# Patient Record
Sex: FEMALE | Race: WHITE | Employment: UNEMPLOYED | URBAN - METROPOLITAN AREA
[De-identification: names, ages, dates, MRNs, and addresses within clinical notes are randomized per-mention and may not be internally consistent; named-entity substitution may affect disease eponyms.]

---

## 2018-04-24 ENCOUNTER — PAT TELEPHONE (OUTPATIENT)
Dept: PREADMISSION TESTING | Age: 78
End: 2018-04-24

## 2018-04-24 VITALS — WEIGHT: 138 LBS | HEIGHT: 64 IN | BODY MASS INDEX: 23.56 KG/M2

## 2018-04-24 RX ORDER — ATORVASTATIN CALCIUM 20 MG/1
20 TABLET, FILM COATED ORAL DAILY
COMMUNITY

## 2018-04-24 RX ORDER — ALENDRONATE SODIUM 70 MG/1
70 TABLET ORAL
COMMUNITY

## 2018-04-24 RX ORDER — LEVOTHYROXINE SODIUM 0.05 MG/1
50 TABLET ORAL DAILY
COMMUNITY

## 2018-04-24 RX ORDER — CYCLOBENZAPRINE HCL 10 MG
10 TABLET ORAL 3 TIMES DAILY PRN
COMMUNITY
End: 2019-05-02

## 2018-04-30 ENCOUNTER — HOSPITAL ENCOUNTER (OUTPATIENT)
Dept: ENDOSCOPY | Age: 78
Discharge: HOME | End: 2018-04-30
Attending: INTERNAL MEDICINE | Admitting: INTERNAL MEDICINE

## 2018-04-30 VITALS
TEMPERATURE: 97.6 F | BODY MASS INDEX: 23.73 KG/M2 | OXYGEN SATURATION: 99 % | SYSTOLIC BLOOD PRESSURE: 155 MMHG | DIASTOLIC BLOOD PRESSURE: 76 MMHG | RESPIRATION RATE: 16 BRPM | HEIGHT: 64 IN | WEIGHT: 139 LBS | HEART RATE: 72 BPM

## 2018-04-30 DIAGNOSIS — D50.9 IRON DEFICIENCY ANEMIA: ICD-10-CM

## 2018-04-30 RX ORDER — SODIUM CHLORIDE 9 MG/ML
INJECTION, SOLUTION INTRAVENOUS CONTINUOUS
Status: DISCONTINUED | OUTPATIENT
Start: 2018-04-30 | End: 2018-05-01 | Stop reason: HOSPADM

## 2018-04-30 RX ORDER — SODIUM CHLORIDE 0.9 % (FLUSH) 0.9 %
10 SYRINGE (ML) INJECTION EVERY 12 HOURS SCHEDULED
Status: DISCONTINUED | OUTPATIENT
Start: 2018-04-30 | End: 2018-05-01 | Stop reason: HOSPADM

## 2018-04-30 RX ORDER — ONDANSETRON 2 MG/ML
4 INJECTION INTRAMUSCULAR; INTRAVENOUS
Status: ACTIVE | OUTPATIENT
Start: 2018-04-30 | End: 2018-04-30

## 2018-04-30 RX ORDER — SODIUM CHLORIDE 0.9 % (FLUSH) 0.9 %
10 SYRINGE (ML) INJECTION PRN
Status: DISCONTINUED | OUTPATIENT
Start: 2018-04-30 | End: 2018-05-01 | Stop reason: HOSPADM

## 2018-04-30 RX ADMIN — SODIUM CHLORIDE: 9 INJECTION, SOLUTION INTRAVENOUS at 12:32

## 2018-04-30 ASSESSMENT — LIFESTYLE VARIABLES: SMOKING_STATUS: 0

## 2018-04-30 ASSESSMENT — PAIN - FUNCTIONAL ASSESSMENT: PAIN_FUNCTIONAL_ASSESSMENT: 0-10

## 2018-04-30 ASSESSMENT — PAIN SCALES - GENERAL
PAINLEVEL_OUTOF10: 0

## 2018-04-30 ASSESSMENT — ENCOUNTER SYMPTOMS: SHORTNESS OF BREATH: 0

## 2018-05-16 ENCOUNTER — PAT TELEPHONE (OUTPATIENT)
Dept: PREADMISSION TESTING | Age: 78
End: 2018-05-16

## 2018-05-16 VITALS — HEIGHT: 64 IN | BODY MASS INDEX: 23.73 KG/M2 | WEIGHT: 139 LBS

## 2018-05-18 RX ORDER — OXYCODONE HYDROCHLORIDE AND ACETAMINOPHEN 5; 325 MG/1; MG/1
2 TABLET ORAL PRN
Status: ACTIVE | OUTPATIENT
Start: 2018-05-18 | End: 2018-05-18

## 2018-05-18 RX ORDER — FENTANYL CITRATE 50 UG/ML
25 INJECTION, SOLUTION INTRAMUSCULAR; INTRAVENOUS EVERY 5 MIN PRN
Status: DISCONTINUED | OUTPATIENT
Start: 2018-05-18 | End: 2018-05-22 | Stop reason: HOSPADM

## 2018-05-18 RX ORDER — MORPHINE SULFATE 4 MG/ML
1 INJECTION, SOLUTION INTRAMUSCULAR; INTRAVENOUS EVERY 5 MIN PRN
Status: DISCONTINUED | OUTPATIENT
Start: 2018-05-18 | End: 2018-05-22 | Stop reason: HOSPADM

## 2018-05-18 RX ORDER — MEPERIDINE HYDROCHLORIDE 25 MG/ML
12.5 INJECTION INTRAMUSCULAR; INTRAVENOUS; SUBCUTANEOUS EVERY 5 MIN PRN
Status: DISCONTINUED | OUTPATIENT
Start: 2018-05-18 | End: 2018-05-22 | Stop reason: HOSPADM

## 2018-05-18 RX ORDER — OXYCODONE HYDROCHLORIDE AND ACETAMINOPHEN 5; 325 MG/1; MG/1
1 TABLET ORAL PRN
Status: ACTIVE | OUTPATIENT
Start: 2018-05-18 | End: 2018-05-18

## 2018-05-18 RX ORDER — FENTANYL CITRATE 50 UG/ML
50 INJECTION, SOLUTION INTRAMUSCULAR; INTRAVENOUS EVERY 5 MIN PRN
Status: DISCONTINUED | OUTPATIENT
Start: 2018-05-18 | End: 2018-05-22 | Stop reason: HOSPADM

## 2018-05-18 RX ORDER — ONDANSETRON 2 MG/ML
4 INJECTION INTRAMUSCULAR; INTRAVENOUS
Status: ACTIVE | OUTPATIENT
Start: 2018-05-18 | End: 2018-05-18

## 2018-05-18 RX ORDER — MORPHINE SULFATE 4 MG/ML
2 INJECTION, SOLUTION INTRAMUSCULAR; INTRAVENOUS EVERY 5 MIN PRN
Status: DISCONTINUED | OUTPATIENT
Start: 2018-05-18 | End: 2018-05-22 | Stop reason: HOSPADM

## 2018-05-18 ASSESSMENT — LIFESTYLE VARIABLES: SMOKING_STATUS: 0

## 2018-05-21 ENCOUNTER — HOSPITAL ENCOUNTER (OUTPATIENT)
Dept: ENDOSCOPY | Age: 78
Discharge: HOME | End: 2018-05-21

## 2018-05-21 VITALS
WEIGHT: 141 LBS | HEIGHT: 64 IN | RESPIRATION RATE: 18 BRPM | SYSTOLIC BLOOD PRESSURE: 134 MMHG | HEART RATE: 73 BPM | TEMPERATURE: 97.7 F | BODY MASS INDEX: 24.07 KG/M2 | DIASTOLIC BLOOD PRESSURE: 71 MMHG | OXYGEN SATURATION: 97 %

## 2018-05-21 DIAGNOSIS — D50.9 IRON DEFICIENCY ANEMIA: ICD-10-CM

## 2018-05-21 RX ORDER — LIDOCAINE HYDROCHLORIDE 10 MG/ML
1 INJECTION, SOLUTION EPIDURAL; INFILTRATION; INTRACAUDAL; PERINEURAL
Status: ACTIVE | OUTPATIENT
Start: 2018-05-21 | End: 2018-05-21

## 2018-05-21 RX ORDER — SODIUM CHLORIDE 9 MG/ML
INJECTION, SOLUTION INTRAVENOUS CONTINUOUS
Status: DISCONTINUED | OUTPATIENT
Start: 2018-05-21 | End: 2018-05-22 | Stop reason: HOSPADM

## 2018-05-21 RX ORDER — SODIUM CHLORIDE 0.9 % (FLUSH) 0.9 %
10 SYRINGE (ML) INJECTION PRN
Status: DISCONTINUED | OUTPATIENT
Start: 2018-05-21 | End: 2018-05-22 | Stop reason: HOSPADM

## 2018-05-21 RX ORDER — SODIUM CHLORIDE 0.9 % (FLUSH) 0.9 %
10 SYRINGE (ML) INJECTION EVERY 12 HOURS SCHEDULED
Status: DISCONTINUED | OUTPATIENT
Start: 2018-05-21 | End: 2018-05-22 | Stop reason: HOSPADM

## 2018-05-21 RX ADMIN — SODIUM CHLORIDE: 9 INJECTION, SOLUTION INTRAVENOUS at 12:46

## 2018-05-21 ASSESSMENT — PAIN - FUNCTIONAL ASSESSMENT: PAIN_FUNCTIONAL_ASSESSMENT: 0-10

## 2018-05-21 ASSESSMENT — PAIN SCALES - GENERAL
PAINLEVEL_OUTOF10: 0

## 2018-05-21 ASSESSMENT — PAIN DESCRIPTION - DESCRIPTORS: DESCRIPTORS: SHARP;RADIATING

## 2019-05-02 ENCOUNTER — APPOINTMENT (OUTPATIENT)
Dept: GENERAL RADIOLOGY | Age: 79
End: 2019-05-02
Payer: MEDICARE

## 2019-05-02 ENCOUNTER — HOSPITAL ENCOUNTER (OUTPATIENT)
Age: 79
Setting detail: OBSERVATION
Discharge: HOME | End: 2019-05-03
Attending: EMERGENCY MEDICINE | Admitting: INTERNAL MEDICINE
Payer: MEDICARE

## 2019-05-02 ENCOUNTER — APPOINTMENT (OUTPATIENT)
Dept: MRI IMAGING | Age: 79
End: 2019-05-02
Payer: MEDICARE

## 2019-05-02 ENCOUNTER — APPOINTMENT (OUTPATIENT)
Dept: CT IMAGING | Age: 79
End: 2019-05-02
Payer: MEDICARE

## 2019-05-02 DIAGNOSIS — R20.0 NUMBNESS: Primary | ICD-10-CM

## 2019-05-02 PROBLEM — G45.9 TIA (TRANSIENT ISCHEMIC ATTACK): Status: ACTIVE | Noted: 2019-05-02

## 2019-05-02 LAB
A/G RATIO: 1.3 (ref 1.1–2.2)
A/G RATIO: 1.4 (ref 1.1–2.2)
ALBUMIN SERPL-MCNC: 4.1 G/DL (ref 3.4–5)
ALBUMIN SERPL-MCNC: 4.1 G/DL (ref 3.4–5)
ALP BLD-CCNC: 65 U/L (ref 40–129)
ALP BLD-CCNC: 68 U/L (ref 40–129)
ALT SERPL-CCNC: 12 U/L (ref 10–40)
ALT SERPL-CCNC: 13 U/L (ref 10–40)
ANION GAP SERPL CALCULATED.3IONS-SCNC: 10 MMOL/L (ref 3–16)
ANION GAP SERPL CALCULATED.3IONS-SCNC: 11 MMOL/L (ref 3–16)
APTT: 30.4 SEC (ref 26–36)
AST SERPL-CCNC: 17 U/L (ref 15–37)
AST SERPL-CCNC: 18 U/L (ref 15–37)
BASOPHILS ABSOLUTE: 0 K/UL (ref 0–0.2)
BASOPHILS RELATIVE PERCENT: 0.7 %
BILIRUB SERPL-MCNC: 0.3 MG/DL (ref 0–1)
BILIRUB SERPL-MCNC: 0.3 MG/DL (ref 0–1)
BUN BLDV-MCNC: 11 MG/DL (ref 7–20)
BUN BLDV-MCNC: 13 MG/DL (ref 7–20)
CALCIUM SERPL-MCNC: 9.4 MG/DL (ref 8.3–10.6)
CALCIUM SERPL-MCNC: 9.5 MG/DL (ref 8.3–10.6)
CHLORIDE BLD-SCNC: 107 MMOL/L (ref 99–110)
CHLORIDE BLD-SCNC: 108 MMOL/L (ref 99–110)
CO2: 24 MMOL/L (ref 21–32)
CO2: 24 MMOL/L (ref 21–32)
CREAT SERPL-MCNC: 0.8 MG/DL (ref 0.6–1.2)
CREAT SERPL-MCNC: 0.9 MG/DL (ref 0.6–1.2)
EOSINOPHILS ABSOLUTE: 0.1 K/UL (ref 0–0.6)
EOSINOPHILS RELATIVE PERCENT: 2.1 %
GFR AFRICAN AMERICAN: >60
GFR AFRICAN AMERICAN: >60
GFR NON-AFRICAN AMERICAN: >60
GFR NON-AFRICAN AMERICAN: >60
GLOBULIN: 2.9 G/DL
GLOBULIN: 3.1 G/DL
GLUCOSE BLD-MCNC: 104 MG/DL (ref 70–99)
GLUCOSE BLD-MCNC: 89 MG/DL
GLUCOSE BLD-MCNC: 89 MG/DL (ref 70–99)
GLUCOSE BLD-MCNC: 97 MG/DL (ref 70–99)
HCT VFR BLD CALC: 39.6 % (ref 36–48)
HEMOGLOBIN: 13.3 G/DL (ref 12–16)
INR BLD: 0.96 (ref 0.86–1.14)
LYMPHOCYTES ABSOLUTE: 1.3 K/UL (ref 1–5.1)
LYMPHOCYTES RELATIVE PERCENT: 24.7 %
MCH RBC QN AUTO: 29.7 PG (ref 26–34)
MCHC RBC AUTO-ENTMCNC: 33.5 G/DL (ref 31–36)
MCV RBC AUTO: 88.6 FL (ref 80–100)
MONOCYTES ABSOLUTE: 0.4 K/UL (ref 0–1.3)
MONOCYTES RELATIVE PERCENT: 7 %
NEUTROPHILS ABSOLUTE: 3.6 K/UL (ref 1.7–7.7)
NEUTROPHILS RELATIVE PERCENT: 65.5 %
PDW BLD-RTO: 13.9 % (ref 12.4–15.4)
PERFORMED ON: NORMAL
PLATELET # BLD: 222 K/UL (ref 135–450)
PMV BLD AUTO: 8.1 FL (ref 5–10.5)
POTASSIUM REFLEX MAGNESIUM: 4.6 MMOL/L (ref 3.5–5.1)
POTASSIUM SERPL-SCNC: 4.5 MMOL/L (ref 3.5–5.1)
PROTHROMBIN TIME: 11 SEC (ref 9.8–13)
RBC # BLD: 4.47 M/UL (ref 4–5.2)
SODIUM BLD-SCNC: 142 MMOL/L (ref 136–145)
SODIUM BLD-SCNC: 142 MMOL/L (ref 136–145)
TOTAL PROTEIN: 7 G/DL (ref 6.4–8.2)
TOTAL PROTEIN: 7.2 G/DL (ref 6.4–8.2)
TROPONIN: <0.01 NG/ML
WBC # BLD: 5.4 K/UL (ref 4–11)

## 2019-05-02 PROCEDURE — 85730 THROMBOPLASTIN TIME PARTIAL: CPT

## 2019-05-02 PROCEDURE — 85025 COMPLETE CBC W/AUTO DIFF WBC: CPT

## 2019-05-02 PROCEDURE — 2580000003 HC RX 258: Performed by: INTERNAL MEDICINE

## 2019-05-02 PROCEDURE — 93010 ELECTROCARDIOGRAM REPORT: CPT | Performed by: INTERNAL MEDICINE

## 2019-05-02 PROCEDURE — 6360000004 HC RX CONTRAST MEDICATION: Performed by: EMERGENCY MEDICINE

## 2019-05-02 PROCEDURE — G0378 HOSPITAL OBSERVATION PER HR: HCPCS

## 2019-05-02 PROCEDURE — 93005 ELECTROCARDIOGRAM TRACING: CPT | Performed by: EMERGENCY MEDICINE

## 2019-05-02 PROCEDURE — 36415 COLL VENOUS BLD VENIPUNCTURE: CPT

## 2019-05-02 PROCEDURE — 70450 CT HEAD/BRAIN W/O DYE: CPT

## 2019-05-02 PROCEDURE — 85610 PROTHROMBIN TIME: CPT

## 2019-05-02 PROCEDURE — 80053 COMPREHEN METABOLIC PANEL: CPT

## 2019-05-02 PROCEDURE — 70551 MRI BRAIN STEM W/O DYE: CPT

## 2019-05-02 PROCEDURE — 6370000000 HC RX 637 (ALT 250 FOR IP): Performed by: INTERNAL MEDICINE

## 2019-05-02 PROCEDURE — 70496 CT ANGIOGRAPHY HEAD: CPT

## 2019-05-02 PROCEDURE — 84484 ASSAY OF TROPONIN QUANT: CPT

## 2019-05-02 PROCEDURE — 97161 PT EVAL LOW COMPLEX 20 MIN: CPT | Performed by: PHYSICAL THERAPIST

## 2019-05-02 PROCEDURE — 6360000002 HC RX W HCPCS: Performed by: INTERNAL MEDICINE

## 2019-05-02 PROCEDURE — 96372 THER/PROPH/DIAG INJ SC/IM: CPT

## 2019-05-02 PROCEDURE — 70498 CT ANGIOGRAPHY NECK: CPT

## 2019-05-02 PROCEDURE — 99285 EMERGENCY DEPT VISIT HI MDM: CPT

## 2019-05-02 PROCEDURE — 71045 X-RAY EXAM CHEST 1 VIEW: CPT

## 2019-05-02 RX ORDER — SODIUM CHLORIDE 0.9 % (FLUSH) 0.9 %
10 SYRINGE (ML) INJECTION EVERY 12 HOURS SCHEDULED
Status: DISCONTINUED | OUTPATIENT
Start: 2019-05-02 | End: 2019-05-03 | Stop reason: HOSPADM

## 2019-05-02 RX ORDER — TIZANIDINE 4 MG/1
4 TABLET ORAL NIGHTLY PRN
Refills: 3 | COMMUNITY
Start: 2019-03-05

## 2019-05-02 RX ORDER — CYCLOBENZAPRINE HCL 10 MG
10 TABLET ORAL 3 TIMES DAILY PRN
Status: DISCONTINUED | OUTPATIENT
Start: 2019-05-02 | End: 2019-05-03 | Stop reason: HOSPADM

## 2019-05-02 RX ORDER — SODIUM CHLORIDE 0.9 % (FLUSH) 0.9 %
10 SYRINGE (ML) INJECTION PRN
Status: DISCONTINUED | OUTPATIENT
Start: 2019-05-02 | End: 2019-05-03 | Stop reason: HOSPADM

## 2019-05-02 RX ORDER — LEVOTHYROXINE SODIUM 0.05 MG/1
50 TABLET ORAL DAILY
Status: DISCONTINUED | OUTPATIENT
Start: 2019-05-02 | End: 2019-05-03 | Stop reason: HOSPADM

## 2019-05-02 RX ORDER — ATORVASTATIN CALCIUM 40 MG/1
40 TABLET, FILM COATED ORAL DAILY
Status: DISCONTINUED | OUTPATIENT
Start: 2019-05-02 | End: 2019-05-03 | Stop reason: HOSPADM

## 2019-05-02 RX ORDER — ASPIRIN 81 MG/1
81 TABLET ORAL DAILY
Status: DISCONTINUED | OUTPATIENT
Start: 2019-05-02 | End: 2019-05-03 | Stop reason: HOSPADM

## 2019-05-02 RX ORDER — LABETALOL HYDROCHLORIDE 5 MG/ML
10 INJECTION, SOLUTION INTRAVENOUS EVERY 10 MIN PRN
Status: DISCONTINUED | OUTPATIENT
Start: 2019-05-02 | End: 2019-05-03 | Stop reason: HOSPADM

## 2019-05-02 RX ORDER — ONDANSETRON 2 MG/ML
4 INJECTION INTRAMUSCULAR; INTRAVENOUS EVERY 6 HOURS PRN
Status: DISCONTINUED | OUTPATIENT
Start: 2019-05-02 | End: 2019-05-03 | Stop reason: HOSPADM

## 2019-05-02 RX ADMIN — ASPIRIN 81 MG: 81 TABLET, COATED ORAL at 12:50

## 2019-05-02 RX ADMIN — ENOXAPARIN SODIUM 40 MG: 40 INJECTION SUBCUTANEOUS at 12:50

## 2019-05-02 RX ADMIN — CHOLECALCIFEROL CAP 125 MCG (5000 UNIT) 5000 UNITS: 125 CAP at 12:50

## 2019-05-02 RX ADMIN — CYCLOBENZAPRINE HYDROCHLORIDE 10 MG: 10 TABLET, FILM COATED ORAL at 12:52

## 2019-05-02 RX ADMIN — IOPAMIDOL 75 ML: 755 INJECTION, SOLUTION INTRAVENOUS at 07:51

## 2019-05-02 RX ADMIN — Medication 10 ML: at 21:19

## 2019-05-02 ASSESSMENT — PAIN DESCRIPTION - ONSET
ONSET: PROGRESSIVE
ONSET: AWAKENED FROM SLEEP

## 2019-05-02 ASSESSMENT — PAIN DESCRIPTION - PAIN TYPE: TYPE: ACUTE PAIN

## 2019-05-02 ASSESSMENT — PAIN DESCRIPTION - DESCRIPTORS
DESCRIPTORS: NUMBNESS
DESCRIPTORS: SHARP;BURNING

## 2019-05-02 ASSESSMENT — ENCOUNTER SYMPTOMS
EYE PAIN: 0
RHINORRHEA: 0
COUGH: 0
BACK PAIN: 1
SHORTNESS OF BREATH: 0
ABDOMINAL PAIN: 0
EYE DISCHARGE: 0
WHEEZING: 0
NAUSEA: 0
DIARRHEA: 0
VOMITING: 0
SORE THROAT: 0

## 2019-05-02 ASSESSMENT — PAIN DESCRIPTION - ORIENTATION
ORIENTATION: LEFT
ORIENTATION: RIGHT

## 2019-05-02 ASSESSMENT — PAIN DESCRIPTION - PROGRESSION
CLINICAL_PROGRESSION: NOT CHANGED
CLINICAL_PROGRESSION: GRADUALLY WORSENING

## 2019-05-02 ASSESSMENT — PAIN SCALES - GENERAL
PAINLEVEL_OUTOF10: 0
PAINLEVEL_OUTOF10: 4
PAINLEVEL_OUTOF10: 0
PAINLEVEL_OUTOF10: 0
PAINLEVEL_OUTOF10: 4

## 2019-05-02 ASSESSMENT — PAIN DESCRIPTION - FREQUENCY
FREQUENCY: CONTINUOUS
FREQUENCY: INTERMITTENT

## 2019-05-02 ASSESSMENT — PAIN - FUNCTIONAL ASSESSMENT: PAIN_FUNCTIONAL_ASSESSMENT: PREVENTS OR INTERFERES SOME ACTIVE ACTIVITIES AND ADLS

## 2019-05-02 ASSESSMENT — PAIN DESCRIPTION - LOCATION
LOCATION: HIP
LOCATION: FACE

## 2019-05-02 NOTE — ED NOTES
Pt ambulated to the bathroom independently, with steady gait.  No c/o pain or numbness     [facility]  05/02/19 [id number]

## 2019-05-02 NOTE — PROGRESS NOTES
Occupational Therapy    Discussed pt with PT and they report that pt is denying needs and has returned to baseline. Will defer eval at this time per pt wishes.     Gail Morataya OTR/L

## 2019-05-02 NOTE — CARE COORDINATION
INITIAL CASE MANAGEMENT ASSESSMENT    Reviewed chart, met with patient to assess possible discharge needs. Explained Case Management role/services. Living Situation:  Lives in own home with . Laundry is located in the basement rest of home is on one level. There is one step  to enter the home , no railing. Bathroom is a walk-in shower with built-in shower seat. ADLs:  Independent      DME:  Standard walker     PT/OT Recs:      Discharge Recommendations:  Home with assist PRN   PT Equipment Recommendations  Equipment Needed: No  Noemy Chester scored a 24/24 on the AM-PAC short mobility form. At this time, no further PT is recommended upon discharge. Recommend patient returns to prior setting with prior services. Active Services:   N/A      Transportation:  Per self or  by private car. Medications:  No issues getting , taking or affording medication. PCP:  Dr. Cuca Bhatt       HD/PD:   N/A     PLAN/COMMENTS:  Plans to return home with no anticipated needs.  is in good health and is able to assist if needed. SW/CM provided contact information for patient or family to call with any questions. SW/CM will follow and assist as needed.

## 2019-05-02 NOTE — H&P
Hospital Medicine History & Physical      PCP: Juan Dorsey    Date of Admission: 5/2/2019    Date of Service: Pt seen/examined on 5/2/2019 and Placed in Observation. Chief Complaint:  Left facial numbness      History Of Present Illness: The patient is a 70 y.o. female with hx HLD and hypothyroidism who presents to [facility] with left sided facial numbness. Pt states that she woke up this morning around 5AM and saw flashing red streaks of light of her left eye. She also stated that she was having left sided facial numbness along the left cheek, left lip, and left chin. Pt states that her blood pressure was also elevated to SBPs of 180s. She never has had high blood pressures before. She has never experienced symptoms like this before. Denies fever, chills, chest pain, shortness of breath, abdominal pain, nausea, vomiting, constipation, diarrhea, and dysuria. CT Head without contrast, CTA Head and Neck all negative for any acute abnormality. Past Medical History:        Diagnosis Date    Cancer [facility])     SKIN    Hyperlipidemia     Thyroid disease        Past Surgical History:        Procedure Laterality Date    COLONOSCOPY  04/30/2018    [facility], normal    JOINT REPLACEMENT      THR    JOINT REPLACEMENT      R hip    UPPER GASTROINTESTINAL ENDOSCOPY  05/21/2018    [facility]       Medications Prior to Admission:    Prior to Admission medications    Medication Sig Start Date End Date Taking?  Authorizing Provider   alendronate (FOSAMAX) 70 MG tablet Take 70 mg by mouth every 7 days    Historical Provider, MD   Cholecalciferol (VITAMIN D3) 5000 units TABS Take by mouth    Historical Provider, MD   atorvastatin (LIPITOR) 20 MG tablet Take 20 mg by mouth daily    Historical Provider, MD   levothyroxine (SYNTHROID) 50 MCG tablet Take 50 mcg by mouth Daily    Historical Provider, MD   cyclobenzaprine (FLEXERIL) 10 MG tablet Take 10 mg by mouth 3 times daily as needed for Muscle spasms    Historical Provider, MD       Allergies:  Patient has no known allergies. Social History:  The patient currently lives at home    TOBACCO:   reports that she has never smoked. She has never used smokeless tobacco.  ETOH:   reports that she does not drink alcohol. Family History:  Reviewed in detail and negative for DM, Early CAD, Cancer, CVA. Positive as follows:        Problem Relation Age of Onset    Heart Attack Mother     Cancer Father        REVIEW OF SYSTEMS:   Positive for and as noted in the HPI. All other systems reviewed and negative. PHYSICAL EXAM:    BP (!) 177/83   Pulse 77   Temp 97.5 °F (36.4 °C) (Oral)   Resp 17   Wt 136 lb 7.4 oz (61.9 kg)   SpO2 99%   BMI 23.42 kg/m²     General appearance: No apparent distress appears stated age and cooperative. HEENT Normal cephalic, atraumatic without obvious deformity. Pupils equal, round, and reactive to light. Extra ocular muscles intact. Conjunctivae/corneas clear. Neck: Supple, No jugular venous distention/bruits. Trachea midline without thyromegaly or adenopathy with full range of motion. Lungs: Clear to auscultation, bilaterally without Rales/Wheezes/Rhonchi with good respiratory effort. Heart: Regular rate and rhythm with Normal S1/S2 without murmurs, rubs or gallops, point of maximum impulse non-displaced  Abdomen: Soft, non-tender or non-distended without rigidity or guarding and positive bowel sounds all four quadrants. Extremities: No clubbing, cyanosis, or edema bilaterally. Full range of motion without deformity and normal gait intact. Skin: Skin color, texture, turgor normal.  No rashes or lesions. Neurologic: Alert and oriented X 3, neurovascularly intact with sensory/motor intact upper extremities/lower extremities, bilaterally. Decreased sensation over V2 and V3 on left side. Cranial nerves: II-XII intact, grossly non-focal.  Mental status: Alert, oriented, thought content appropriate. Capillary Refill: Acceptable  < 3 seconds  Peripheral Pulses: +3 Easily felt, not easily obliterated with pressure      CXR:  I have reviewed the CXR with the following interpretation: no acute cardiopulmonary process  EKG:  I have reviewed the EKG with the following interpretation: NSR    MRI brain without contrast   Final Result   1. No acute intracranial abnormality. No acute infarct. 2. Minimal global parenchymal volume loss with mild chronic microvascular   ischemic changes. XR CHEST PORTABLE   Final Result   No acute cardiopulmonary disease. CT Head WO Contrast   Final Result   No acute intracranial abnormality. Critical results were called by Dr. Manisha Villasenor MD to Rahul Hortencia   on 5/2/2019 at 08:03. CTA HEAD W CONTRAST   Final Result   1. Patient motion partially limits evaluation of the cervical carotid   arteries. 2. No convincing flow limiting stenosis of the cervical carotid/vertebral   arteries. 3. No focal stenosis of the [id number]-Hzmdug. CTA NECK W CONTRAST   Final Result   1. Patient motion partially limits evaluation of the cervical carotid   arteries. 2. No convincing flow limiting stenosis of the cervical carotid/vertebral   arteries. 3. No focal stenosis of the [id number]. CBC   Recent Labs     05/02/19  0748   WBC 5.4   HGB 13.3   HCT 39.6         RENAL  Recent Labs     05/02/19  0748      K 4.5      CO2 24   BUN 13   CREATININE 0.9     LFT'S  Recent Labs     05/02/19  0748   AST 18   ALT 13   BILITOT 0.3   ALKPHOS 68     COAG  No results for input(s): INR in the last 72 hours. CARDIAC ENZYMES  No results for input(s): CKTOTAL, CKMB, CKMBINDEX, TROPONINI in the last 72 hours.     U/A:  No results found for: NITRITE, COLORU, [address], RBCUA, MUCUS, BACTERIA, CLARITYU, SPECGRAV, LEUKOCYTESUR, BLOODU, GLUCOSEU, AMORPHOUS    ABG  No results found for: KGM2MXH, BEART, A4HFYGHK, PHART, THGBART, YVT6QRM, PO2ART, TRI0GQX        Active Hospital Problems    Diagnosis Date Noted    TIA (transient ischemic attack) [G45.9] 05/02/2019         PHYSICIANS CERTIFICATION:    I certify that Tamica Eng is expected to be hospitalized for less than 2 midnights based on the following assessment and plan:      ASSESSMENT/PLAN:      Left facial numbness - 2/2 TIA/CVA and/or complex migraine  -MRI Brain without contrast  -neurology consulted  -ASA and statin  -tele monitoring  -Labetalol PRN for SBP > 210  -permissive HTN for 24-48 hours  -PT/OT   -will re-evaluate eye symptoms; may have to refer to ophthalmology   -check lipid panel in AM    Hypothyroidism  -continue home meds      DVT Prophylaxis: Lovenox  Diet: DIET GENERAL;  Code Status: Full Code  PT/OT Eval Status: ordered    Dispo - admit PCU obs       Robert Mckenzie MD    Thank you Daryle Seat for the opportunity to be involved in this patient's care. If you have any questions or concerns please feel free to contact me at [phone].

## 2019-05-02 NOTE — ED PROVIDER NOTES
[facility]  eMERGENCY dEPARTMENT eNCOUnter        Pt Name: Amadou Wilder  MRN: [id number]  [place] 1940  Date of evaluation: 5/2/2019  Provider: Margaret Kowalski MD  PCP: Reece Montes De Oca       Chief Complaint   Patient presents with    Numbness     left side of face. woke up around 5am with numbness        HISTORY OFPRESENT ILLNESS   (Location/Symptom, Timing/Onset, Context/Setting, Quality, Duration, Modifying Factors,Severity)  Note limiting factors. Amadou Wilder is a 70 y.o. female       Location/Symptom: Left-sided facial numbness. Timing/Onset: Summer about an hour and a half ago. Context/Setting: She does not have a lot of chronic medical problems and she does not take any blood thinners. She does get numbness in her legs but it seems to be related to back and hip problems which is not acute. She woke up in usual state of health and then started having numbness along the left lower portion of her face. She is also describing flashing lights on the left side of her visual field that she states back like emergency lights flashing red. Does seem to be gone at the moment. Quality: Numbness seems to be isolated just to the left lower face  Duration: Approximately 2 hours  Modifying Factors: None  Severity: 0    Nursing Noteswere all reviewed and agreed with or any disagreements were addressed  in the HPI. REVIEW OF SYSTEMS    (2-9 systems for level 4, 10 or more for level 5)     Review of Systems   Constitutional: Negative for chills, fatigue and fever. HENT: Negative for ear pain, rhinorrhea and sore throat. Eyes: Positive for visual disturbance. Negative for pain and discharge. Respiratory: Negative for cough, shortness of breath and wheezing. Cardiovascular: Negative for chest pain, palpitations and leg swelling. Gastrointestinal: Negative for abdominal pain, diarrhea, nausea and vomiting.    Genitourinary: Negative for difficulty urinating, dysuria, pelvic pain and vaginal discharge. Musculoskeletal: Positive for back pain. Negative for arthralgias, joint swelling and neck pain. Skin: Negative for rash. Allergic/Immunologic: Negative for environmental allergies. Neurological: Positive for numbness. Negative for dizziness, seizures, syncope and headaches. Hematological: Negative for adenopathy. Psychiatric/Behavioral: Negative for dysphoric mood and suicidal ideas. The patient is not nervous/anxious. PAST MEDICAL HISTORY     Past Medical History:   Diagnosis Date    Cancer [facility])     SKIN    Hyperlipidemia     Thyroid disease          SURGICAL HISTORY     Past Surgical History:   Procedure Laterality Date    COLONOSCOPY  04/30/2018    [facility], normal    JOINT REPLACEMENT      THR    JOINT REPLACEMENT      R hip    UPPER GASTROINTESTINAL ENDOSCOPY  05/21/2018    [facility]         CURRENTMEDICATIONS       Previous Medications    ALENDRONATE (FOSAMAX) 70 MG TABLET    Take 70 mg by mouth every 7 days    ATORVASTATIN (LIPITOR) 20 MG TABLET    Take 20 mg by mouth daily    CHOLECALCIFEROL (VITAMIN D3) 5000 UNITS TABS    Take by mouth    CYCLOBENZAPRINE (FLEXERIL) 10 MG TABLET    Take 10 mg by mouth 3 times daily as needed for Muscle spasms    LEVOTHYROXINE (SYNTHROID) 50 MCG TABLET    Take 50 mcg by mouth Daily       ALLERGIES     Patient has no known allergies.     FAMILY HISTORY       Family History   Problem Relation Age of Onset    Heart Attack Mother     Cancer Father           SOCIAL HISTORY       Social History     Socioeconomic History    Marital status:      Spouse name: None    Number of children: None    Years of education: None    Highest education level: None   Occupational History    None   Social Needs    Financial resource strain: None    Food insecurity:     Worry: None     Inability: None    Transportation needs:     Medical: None     Non-medical: None   Tobacco Use  Smoking status: Never Smoker    Smokeless tobacco: Never Used   Substance and Sexual Activity    Alcohol use: No    Drug use: No    Sexual activity: None   Lifestyle    Physical activity:     Days per week: None     Minutes per session: None    Stress: None   Relationships    Social connections:     Talks on phone: None     Gets together: None     Attends Restoration service: None     Active member of club or organization: None     Attends meetings of clubs or organizations: None     Relationship status: None    Intimate partner violence:     Fear of current or ex partner: None     Emotionally abused: None     Physically abused: None     Forced sexual activity: None   Other Topics Concern    None   Social History Narrative    None       SCREENINGS   NIH Stroke Scale  NIH Stroke Scale Assessed: Yes  Interval: Reassessment  Level of Consciousness (1a. ): Alert  LOC Questions (1b. ): Answers both correctly  LOC Commands (1c. ): Obeys both correctly  Best Gaze (2. ): Normal  Visual (3. ): No visual loss  Facial Palsy (4. ): Normal  Motor Arm, Left (5a. ): No drift  Motor Arm, Right (5b. ): No drift  Motor Leg, Left (6a. ): No drift  Motor Leg, Right (6b. ): No drift  Limb Ataxia (7. ): Absent  Sensory (8. ): (!) Partial loss  Best Language (9. ): No aphasia  Dysarthria (10. ): Normal  Extinction and Inattention (11): No neglect  Total: 1         PHYSICAL EXAM    (up to 7 for level 4, 8 or more for level 5)     ED Triage Vitals [05/02/19 0741]   BP Temp Temp Source Pulse Resp SpO2 Height Weight   (!) 183/88 97.5 °F (36.4 °C) Oral 78 18 98 % -- 136 lb 7.4 oz (61.9 kg)      weight is 136 lb 7.4 oz (61.9 kg). Her oral temperature is 97.5 °F (36.4 °C). Her blood pressure is 177/83 (abnormal) and her pulse is 77. Her respiration is 17 and oxygen saturation is 99%. Physical Exam   Constitutional: She is oriented to person, place, and time. She appears well-developed and well-nourished.    HENT:   Head: Normocephalic and atraumatic. Right Ear: External ear normal.   Left Ear: External ear normal.   Eyes: Right eye exhibits no discharge. Left eye exhibits no discharge. No scleral icterus. Neck: Normal range of motion. No JVD present. No tracheal deviation present. No thyromegaly present. Cardiovascular: Normal rate and regular rhythm. Exam reveals no gallop and no friction rub. No murmur heard. Pulmonary/Chest: Effort normal and breath sounds normal. No stridor. No respiratory distress. She has no wheezes. She has no rales. Abdominal: Soft. She exhibits no distension. There is no tenderness. There is no rebound and no guarding. Musculoskeletal: She exhibits no edema or tenderness. Neurological: She is alert and oriented to person, place, and time. She has normal strength. A cranial nerve deficit and sensory deficit is present. Coordination normal.   Patient has numbness along the left cheek only. No other sensory deficits. No focal motor deficit. I gave her an NIH score of 2 because she told me it was April. Skin: Skin is warm and dry. No rash (On exposed body surfaces) noted. She is not diaphoretic. Psychiatric: She has a normal mood and affect.  Her behavior is normal. Thought content normal.       DIAGNOSTIC RESULTS   LABS:    Results for orders placed or performed during the hospital encounter of 05/02/19   CBC Auto Differential   Result Value Ref Range    WBC 5.4 4.0 - 11.0 K/uL    RBC 4.47 4.00 - 5.20 M/uL    Hemoglobin 13.3 12.0 - 16.0 g/dL    Hematocrit 39.6 36.0 - 48.0 %    MCV 88.6 80.0 - 100.0 fL    MCH 29.7 26.0 - 34.0 pg    MCHC 33.5 31.0 - 36.0 g/dL    RDW 13.9 12.4 - 15.4 %    Platelets 741 881 - 796 K/uL    MPV 8.1 5.0 - 10.5 fL    Neutrophils % 65.5 %    Lymphocytes % 24.7 %    Monocytes % 7.0 %    Eosinophils % 2.1 %    Basophils % 0.7 %    Neutrophils # 3.6 1.7 - 7.7 K/uL    Lymphocytes # 1.3 1.0 - 5.1 K/uL    Monocytes # 0.4 0.0 - 1.3 K/uL    Eosinophils # 0.1 0.0 - 0.6 K/uL Basophils # 0.0 0.0 - 0.2 K/uL   Comprehensive Metabolic Panel   Result Value Ref Range    Sodium 142 136 - 145 mmol/L    Potassium 4.5 3.5 - 5.1 mmol/L    Chloride 108 99 - 110 mmol/L    CO2 24 21 - 32 mmol/L    Anion Gap 10 3 - 16    Glucose 104 (H) 70 - 99 mg/dL    BUN 13 7 - 20 mg/dL    CREATININE 0.9 0.6 - 1.2 mg/dL    GFR Non-African American >60 >60    GFR African American >60 >60    Calcium 9.5 8.3 - 10.6 mg/dL    Total Protein 7.2 6.4 - 8.2 g/dL    Alb 4.1 3.4 - 5.0 g/dL    Albumin/Globulin Ratio 1.3 1.1 - 2.2    Total Bilirubin 0.3 0.0 - 1.0 mg/dL    Alkaline Phosphatase 68 40 - 129 U/L    ALT 13 10 - 40 U/L    AST 18 15 - 37 U/L    Globulin 3.1 g/dL   POCT Glucose   Result Value Ref Range    POC Glucose 89 70 - 99 mg/dl    Performed on ACCU-CHEK    POCT Glucose   Result Value Ref Range    Glucose 89 mg/dL       All other labs were within normal range or not returned as of this dictation. EKG: All EKG's are interpreted by the Emergency Department Physician who either signs orCo-signs this chart in the absence of a cardiologist.    EKG visualized preliminarily interpreted by myself shows sinus rhythm with rate is 62. The axis is normal at 3. Some criteria for left ventricular hypertrophy. Otherwise no acute injury or ischemic pattern. There are some nonspecific repolarization changes seen laterally. RADIOLOGY:   Non-plain film images such as CT, Ultrasound and MRI are read by the radiologist. Lucas Render radiographic images are visualized and preliminarily interpreted by the  EDProvider with the below findings:    Ct Head Wo Contrast    Result Date: 5/2/2019  EXAMINATION: CT OF THE HEAD WITHOUT CONTRAST  5/2/2019 7:50 am TECHNIQUE: CT of the head was performed without the administration of intravenous contrast. Dose modulation, iterative reconstruction, and/or weight based adjustment of the mA/kV was utilized to reduce the radiation dose to as low as reasonably achievable. COMPARISON: None.  HISTORY: ORDERING SYSTEM PROVIDED HISTORY: left sided facial numbness TECHNOLOGIST PROVIDED HISTORY: Has a \"code stroke\" or \"stroke alert\" been called? ->Yes Ordering Physician Provided Reason for Exam: Numbness (left side of face. woke up around 5am with numbness ) Acuity: Acute Type of Exam: Initial FINDINGS: BRAIN/VENTRICLES: There is no acute intracranial hemorrhage, mass effect or midline shift. No abnormal extra-axial fluid collection. The gray-white differentiation is maintained without evidence of an acute infarct. There is no evidence of hydrocephalus. ORBITS: The visualized portion of the orbits demonstrate no acute abnormality. SINUSES: The visualized paranasal sinuses and mastoid air cells demonstrate no acute abnormality. SOFT TISSUES/SKULL:  No acute abnormality of the visualized skull or soft tissues. No acute intracranial abnormality. Critical results were called by Dr. Akahs Orourke MD to Clemencia Oliveira on 5/2/2019 at 08:03. Xr Chest Portable    Result Date: 5/2/2019  EXAMINATION: SINGLE XRAY VIEW OF THE CHEST 5/2/2019 8:13 am COMPARISON: None. HISTORY: ORDERING SYSTEM PROVIDED HISTORY: left sided facial numbness TECHNOLOGIST PROVIDED HISTORY: Reason for exam:->left sided facial numbness Ordering Physician Provided Reason for Exam: poss stroke Acuity: Acute Type of Exam: Initial Relevant Medical/Surgical History: left side weakness FINDINGS: The cardiopericardial silhouette is within normal limits for AP technique. Atherosclerotic calcification of the thoracic aorta is noted. The visible lungs are without pneumothorax, pleural effusion or focal airspace opacity. No acute cardiopulmonary disease. Cta Neck W Contrast    Result Date: 5/2/2019  EXAMINATION: CTA OF THE HEAD WITH CONTRAST; CTA OF THE NECK 5/2/2019 7:50 am: TECHNIQUE: CTA of the head/brain was performed with the administration of intravenous contrast. Multiplanar reformatted images are provided for review.   MIP images are provided for review. Dose modulation, iterative reconstruction, and/or weight based adjustment of the mA/kV was utilized to reduce the radiation dose to as low as reasonably achievable.; CTA of the neck was performed with the administration of intravenous contrast. Multiplanar reformatted images are provided for review. MIP images are provided for review. Stenosis of the internal carotid arteries measured using NASCET criteria. Dose modulation, iterative reconstruction, and/or weight based adjustment of the mA/kV was utilized to reduce the radiation dose to as low as reasonably achievable. COMPARISON: None. HISTORY: ORDERING SYSTEM PROVIDED HISTORY: left sided facial numbness TECHNOLOGIST PROVIDED HISTORY: Has a \"code stroke\" or \"stroke alert\" been called? ->Yes Ordering Physician Provided Reason for Exam: Numbness (left side of face. woke up around 5am with numbness ) Acuity: Acute Type of Exam: Initial FINDINGS: CTA NECK: AORTIC ARCH/ARCH VESSELS: Streak artifact from in flowing contrast partially limits evaluation. There is a normal 3 vessel arch configuration. No convincing focal stenosis of the innominate or visualized subclavian arteries. CAROTID ARTERIES: No focal stenosis of the common carotid arteries. Atherosclerosis of the carotid bulbs and proximal internal carotid arteries. Evaluation of the internal carotid arteries is partially limited due to patient motion. No convincing flow limiting stenosis of the internal carotid arteries by NASCET criteria. VERTEBRAL ARTERIES: The vertebral arteries both arise from the subclavian arteries. No focal stenosis seen of the vertebral arteries. SOFT TISSUES: Minimal scarring in the lung apices bilaterally. No acute abnormality within the visualized superior mediastinum. BONES: No acute osseous abnormality seen. CTA HEAD: ANTERIOR CIRCULATION: Atherosclerosis of the internal carotid arteries bilaterally. No evidence of a flow limiting stenosis.   There is a hypoplastic right A1 segment, which is presumably degenerative in nature. No focal stenosis of the anterior cerebral or middle cerebral arteries. POSTERIOR CIRCULATION: The posterior cerebral arteries demonstrate no focal stenosis. The vertebral and basilar arteries appear unremarkable. BRAIN: No mass effect or midline shift. 1. Patient motion partially limits evaluation of the cervical carotid arteries. 2. No convincing flow limiting stenosis of the cervical carotid/vertebral arteries. 3. No focal stenosis of the [id number]. Cta Head W Contrast    Result Date: 5/2/2019  EXAMINATION: CTA OF THE HEAD WITH CONTRAST; CTA OF THE NECK 5/2/2019 7:50 am: TECHNIQUE: CTA of the head/brain was performed with the administration of intravenous contrast. Multiplanar reformatted images are provided for review. MIP images are provided for review. Dose modulation, iterative reconstruction, and/or weight based adjustment of the mA/kV was utilized to reduce the radiation dose to as low as reasonably achievable.; CTA of the neck was performed with the administration of intravenous contrast. Multiplanar reformatted images are provided for review. MIP images are provided for review. Stenosis of the internal carotid arteries measured using NASCET criteria. Dose modulation, iterative reconstruction, and/or weight based adjustment of the mA/kV was utilized to reduce the radiation dose to as low as reasonably achievable. COMPARISON: None. HISTORY: ORDERING SYSTEM PROVIDED HISTORY: left sided facial numbness TECHNOLOGIST PROVIDED HISTORY: Has a \"code stroke\" or \"stroke alert\" been called? ->Yes Ordering Physician Provided Reason for Exam: Numbness (left side of face. woke up around 5am with numbness ) Acuity: Acute Type of Exam: Initial FINDINGS: CTA NECK: AORTIC ARCH/ARCH VESSELS: Streak artifact from in flowing contrast partially limits evaluation. There is a normal 3 vessel arch configuration.   No convincing focal

## 2019-05-02 NOTE — PROGRESS NOTES
Physical Therapy    Facility/Department: [address] PROGRESSIVE CARE  Initial Assessment/Discharge Note    NAME: Jeanette Moncada  : 1940  MRN: [id number]    Date of Service: 2019    Discharge Recommendations:  Home with assist PRN   PT Equipment Recommendations  Equipment Needed: No  Jeanette Moncada scored a 24/24 on the AM-PAC short mobility form. At this time, no further PT is recommended upon discharge. Recommend patient returns to prior setting with prior services. Assessment   Assessment: pt appears to be at her baseline; no further therapy indicated  Prognosis: Good  Decision Making: Low Complexity  Patient Education: role and purpose of PT  Barriers to Learning: none  No Skilled PT: Safe to return home  REQUIRES PT FOLLOW UP: No  Activity Tolerance  Activity Tolerance: Patient Tolerated treatment well       Patient Diagnosis(es): The encounter diagnosis was Numbness. has a past medical history of Cancer ([facility] [address] Hyperlipidemia, and Thyroid disease. has a past surgical history that includes Colonoscopy (2018); joint replacement; joint replacement; and Upper gastrointestinal endoscopy (2018). Vision/Hearing  Vision: Within Functional Limits  Hearing: Within functional limits     Subjective  General  Chart Reviewed:  Yes  Additional Pertinent Hx: pt is a 71 yo female who was adm to \[facility]\"" with numbness in her L side of face  Response To Previous Treatment: Not applicable  Family / Caregiver Present: Yes( in room)  Follows Commands: Within Functional Limits  Subjective  Subjective: pt reports she has chronic pinched nerve in her R LE in which she has intermittent pain which she has had since HS  Pain Screening  Patient Currently in Pain: Denies          Orientation  Orientation  Overall Orientation Status: Within Functional Limits  Social/Functional History  Social/Functional History  Lives With: Spouse  Type of Home: House  Home Layout: Laundry in basement, One level  Home Access: Stairs to enter without rails  Entrance Stairs - Number of Steps: 1  Bathroom Shower/Tub: Walk-in shower  Bathroom Equipment: Built-in shower seat  Home Equipment: Standard walker  ADL Assistance: Independent  Homemaking Assistance: Independent  Ambulation Assistance: Independent  Transfer Assistance: Independent  Active : Yes  Mode of Transportation: Car  Cognition   Cognition  Overall Cognitive Status: WFL    Objective          AROM RLE (degrees)  RLE AROM: WFL  AROM LLE (degrees)  LLE AROM : WFL  Strength RLE  Comment: decreased hip flex due to pain  Strength LLE  Strength LLE: WFL     Sensation  Overall Sensation Status: WFL(reports numbness in face getting better)  Bed mobility  Supine to Sit: Independent  Transfers  Sit to Stand: Independent  Stand to sit:  Independent  Ambulation  Ambulation?: Yes  Ambulation 1  Surface: level tile  Device: No Device  Assistance: Independent  Quality of Gait: no LOB; slight limp due to chronic issues with her RLE  Stairs/Curb  Stairs?: No     Balance  Sitting - Static: Good  Sitting - Dynamic: Good  Standing - Static: Good  Standing - Dynamic: Good        Plan   Safety Devices  Type of devices: Call light within reach, Left in chair, Nurse notified             AM-PAC Score  AM-PAC Inpatient Mobility Raw Score : 24  AM-PAC Inpatient T-Scale Score : 61.14  Mobility Inpatient CMS 0-100% Score: 0  Mobility Inpatient CMS G-Code Modifier : [address]        Therapy Time   Individual Concurrent Group Co-treatment   Time In 1200         Time Out 1219         Minutes 19               Helen OWENS, PT, [id number]   HELEN GUPTA, PT

## 2019-05-02 NOTE — ED NOTES
Report called to  [facility]   On floor    [place] [address]   Cardiac monitor on  VSS, Afebrile, skin warm dry and intact, normal saline locked   Family updated on transfer            Cody Butler RN  05/02/19 1296

## 2019-05-02 NOTE — PROGRESS NOTES
4 Eyes Skin Assessment     The patient is being assess for  Admission    I agree that 2 RN's have performed a thorough Head to Toe Skin Assessment on the patient. ALL assessment sites listed below have been assessed. Areas assessed by both nurses: yes  [x]   Head, Face, and Ears   [x]   Shoulders, Back, and Chest  [x]   Arms, Elbows, and Hands   [x]   Coccyx, Sacrum, and IschIum  [x]   Legs, Feet, and Heels        Does the Patient have Skin Breakdown?   No         Jaiden Prevention initiated:  No   Wound Care Orders initiated:  No      [facility] nurse consulted for Pressure Injury (Stage 3,4, Unstageable, DTI, NWPT, and Complex wounds), New and Established Ostomies:  No      Nurse 1 eSignature: Electronically signed by Eric Brower RN on 5/2/19 at 6:19 PM    **SHARE this note so that the co-signing nurse is able to place an eSignature**    Nurse 2 eSignature: Electronically signed by Asuncion Bethea RN on 5/2/19 at 6:22 PM

## 2019-05-02 NOTE — CONSULTS
Neurology Consult Note  Reason for Consult: TIA/CVA versus complex migraine    Chief complaint: flashing lights, left facial numbness    Dr Yojana Melendez MD asked me to see Ashly Bustamante in consultation for evaluation of TIA/CVA versus complex migraine    History of Present Illness:  Ashly Bustamante is a 70 y.o. female who presents with vision changes and left facial numbness. I obtained my information via interview with the patient, supplemented by chart review. The patient says that she went to bed around 0030 this morning without any complaints. She says that she woke up from sleep around 0500, got onto the computer to play games, when she began experiencing flashing red streaks of light in the peripheral vision of only her left eye. These were constant. She says she feels like she was having some pain in her left eye, though not severe. She also noticed that the left side of her face was numb (below the eye to her chin and side of her mouth), which also was constant. She did take her BP at the time which was in the high 830K systolic. She denies any additional symptoms at the time. She did take an aspirin prior to arrival, concerned that she may be having a stroke. She says on the way to the ED, which was roughly ~ 2 hours after onset of her symptoms, the flashing red lights in her left eye subsided. BP here was noted to be 183/88. No fever. CT head was without any acute intracranial abnormality. CTA head/neck was without any evidence of a LVO, significant stenosis, or vascular malformation, within the limits of the exam.  She was not administered TPA. She says she continues to have left facial numbness and feels some pain in her left eye. The patient does have a history of migraines, though have been well controlled for several years. She says that she had been on maintenance therapy though cannot recall which medication.   In the past, she says she would experience a severe right sided headache, nausea, light sensitivity, and \"clouds\" in her right eye. She has significant chronic lumbar DDD w/ radiculopathy, primarily affecting her RLE. She says she just started taking a full dose aspirin on Sunday (had not been on prior). Medical History:  Past Medical History:   Diagnosis Date    Cancer [facility])     SKIN    Hyperlipidemia     Thyroid disease      Past Surgical History:   Procedure Laterality Date    COLONOSCOPY  04/30/2018    [place], normal    JOINT REPLACEMENT      THR    JOINT REPLACEMENT      R hip    UPPER GASTROINTESTINAL ENDOSCOPY  05/21/2018    [place]     Scheduled Meds:   atorvastatin  40 mg Oral Daily    vitamin D  5,000 Units Oral Daily    levothyroxine  50 mcg Oral Daily    sodium chloride flush  10 mL Intravenous 2 times per day    enoxaparin  40 mg Subcutaneous Daily    aspirin  81 mg Oral Daily     Medications Prior to Admission:   Diclofenac Sodium  MG TB24, Take 100 mg by mouth daily  tiZANidine (ZANAFLEX) 4 MG tablet, Take 4 mg by mouth nightly as needed for Muscle spasms  aspirin 325 MG EC tablet, Take 325 mg by mouth daily  alendronate (FOSAMAX) 70 MG tablet, Take 70 mg by mouth every 7 days Sunday  Cholecalciferol (VITAMIN D3) 5000 units TABS, Take 5,000 Units by mouth daily   atorvastatin (LIPITOR) 20 MG tablet, Take 20 mg by mouth daily  levothyroxine (SYNTHROID) 50 MCG tablet, Take 50 mcg by mouth Daily    No Known Allergies    Family History   Problem Relation Age of Onset    Heart Attack Mother     Cancer Father      Social History     Tobacco Use   Smoking Status Never Smoker   Smokeless Tobacco Never Used     Social History     Substance and Sexual Activity   Drug Use No     Social History     Substance and Sexual Activity   Alcohol Use No     ROS:  Constitutional- No weight loss or fevers  Eyes- No diplopia. No photophobia. Ears/nose/throat- No dysphagia. No Dysarthria  Cardiovascular- No palpitations.  No chest pain  Respiratory- No dyspnea. No Cough  Gastrointestinal- No Abdominal pain. No Vomiting. Genitourinary- No incontinence. No urinary retention  Musculoskeletal- No myalgia. No arthralgia  Skin- No rash. No easy bruising. Psychiatric- No depression. No anxiety  Endocrine- No diabetes. No thyroid issues. Hematologic- No bleeding difficulty. No fatigue  Neurologic- No weakness. No Headache. Exam:  Blood pressure (!) 167/88, pulse 71, temperature 97.9 °F (36.6 °C), temperature source Oral, resp. rate 20, weight 136 lb 7.4 oz (61.9 kg), SpO2 100 %. Constitutional    Vital signs: BP, HR, and RR reviewed   General alert, no distress, well-nourished  Eyes: unable to visualize the fundi  Cardiovascular: pulses symmetric in all 4 extremities. No peripheral edema. Psychiatric: cooperative with examination, no psychotic behavior noted. Neurologic  Mental status:   orientation to person, place, time, situation. General fund of knowledge grossly intact   Memory grossly intact   Attention intact as able to attend well to the exam     Language fluent in conversation. No aphasia. Comprehension intact; follows simple commands  Cranial nerves:   CN2: visual fields full w/o extinction on confrontational testing  CN 3,4,6: extraocular muscles intact. Pupils are equal, round, reactive bilaterally. CN5: diminished sensation on the left side of her face. CN7: face symmetric without dysarthria. CN8: she does wear hearing aids. CN9: palate elevated symmetrically  CN11: trap full strength on shoulder shrug  CN12: tongue midline with protrusion  Strength: No pronator drift. Good strength in BUE and LLE. ROM in her RLE is limited due to pain. Deep tendon reflexes: normal in all 4 extremities  Sensory: chronic sensory deficit in her RLE. Cerebellar/coordination: finger nose finger normal without ataxia  Tone: normal in all 4 extremities  Gait: deferred at this time.       Labs  Glucose 97  Na 142  K 4.6  BUN 11  Creatinine 0.8  LFTs normal    WBC 5.4K  Hg 13.3  Platelets 566  INR 8.77    Studies  CT head 5/2/19, independently reviewed  No acute intracranial abnormality. CTA head/neck 5/2/19, independently reviewed  No convincing flow limiting stenosis of the cervical carotid/vertebral arteries. No focal stenosis of the [place]. Partially limited evaluation of the cervical carotid arteries due to motion. EKG 5/2/19  Normal sinus rhythm. Impression  1. Persistent left facial numbness. She was also experiencing transient red flashing lights in the peripheral vision of her left eye, and does endorse a very mild pain of the left eye. There is concern for an acute stroke. Other etiologies could include a migraine variant (given her history) or other ophthalmalgic issue (pertaining to the left eye symptoms). 2.  Hyperlipidemia  3. Hx of migraines  4. Hx of lumbar back disease w/ radiculopathy. Recommendations  1. MRI brain w/o contrast.  Scheduled for this afternoon. 2.  Will need additional stroke workup if MRI is +. 3.  Permissive HTN for now. 4.  Continue antiplatelet. 5.  Continue statin. 6.  Telemetry to monitor for atrial fibrillation. 7.  May need an ophthalmology evaluation.       Casey Siddiqi NP  [address] [place] [address] [address] Neurology    A copy of this note was provided for Dr Robert Mckenzie MD

## 2019-05-03 VITALS
WEIGHT: 136.69 LBS | HEIGHT: 64 IN | BODY MASS INDEX: 23.34 KG/M2 | HEART RATE: 76 BPM | TEMPERATURE: 97.6 F | OXYGEN SATURATION: 96 % | SYSTOLIC BLOOD PRESSURE: 143 MMHG | RESPIRATION RATE: 16 BRPM | DIASTOLIC BLOOD PRESSURE: 82 MMHG

## 2019-05-03 PROBLEM — G45.9 TIA (TRANSIENT ISCHEMIC ATTACK): Status: RESOLVED | Noted: 2019-05-02 | Resolved: 2019-05-03

## 2019-05-03 LAB
CHOLESTEROL, TOTAL: 138 MG/DL (ref 0–199)
HDLC SERPL-MCNC: 50 MG/DL (ref 40–60)
LDL CHOLESTEROL CALCULATED: 67 MG/DL
TRIGL SERPL-MCNC: 105 MG/DL (ref 0–150)
VLDLC SERPL CALC-MCNC: 21 MG/DL

## 2019-05-03 PROCEDURE — 80061 LIPID PANEL: CPT

## 2019-05-03 PROCEDURE — 2580000003 HC RX 258: Performed by: INTERNAL MEDICINE

## 2019-05-03 PROCEDURE — 94760 N-INVAS EAR/PLS OXIMETRY 1: CPT

## 2019-05-03 PROCEDURE — 96372 THER/PROPH/DIAG INJ SC/IM: CPT

## 2019-05-03 PROCEDURE — 36415 COLL VENOUS BLD VENIPUNCTURE: CPT

## 2019-05-03 PROCEDURE — 6370000000 HC RX 637 (ALT 250 FOR IP)

## 2019-05-03 PROCEDURE — 6360000002 HC RX W HCPCS: Performed by: INTERNAL MEDICINE

## 2019-05-03 PROCEDURE — G0378 HOSPITAL OBSERVATION PER HR: HCPCS

## 2019-05-03 PROCEDURE — 6370000000 HC RX 637 (ALT 250 FOR IP): Performed by: INTERNAL MEDICINE

## 2019-05-03 RX ORDER — ACETAMINOPHEN 325 MG/1
650 TABLET ORAL EVERY 4 HOURS PRN
Status: DISCONTINUED | OUTPATIENT
Start: 2019-05-03 | End: 2019-05-03 | Stop reason: HOSPADM

## 2019-05-03 RX ORDER — ACETAMINOPHEN 325 MG/1
TABLET ORAL
Status: COMPLETED
Start: 2019-05-03 | End: 2019-05-03

## 2019-05-03 RX ADMIN — LEVOTHYROXINE SODIUM 50 MCG: 50 TABLET ORAL at 06:58

## 2019-05-03 RX ADMIN — ACETAMINOPHEN 650 MG: 325 TABLET ORAL at 05:20

## 2019-05-03 RX ADMIN — CHOLECALCIFEROL CAP 125 MCG (5000 UNIT) 5000 UNITS: 125 CAP at 09:06

## 2019-05-03 RX ADMIN — CYCLOBENZAPRINE HYDROCHLORIDE 10 MG: 10 TABLET, FILM COATED ORAL at 09:06

## 2019-05-03 RX ADMIN — ATORVASTATIN CALCIUM 40 MG: 40 TABLET, FILM COATED ORAL at 09:06

## 2019-05-03 RX ADMIN — Medication 10 ML: at 09:06

## 2019-05-03 RX ADMIN — ENOXAPARIN SODIUM 40 MG: 40 INJECTION SUBCUTANEOUS at 09:06

## 2019-05-03 RX ADMIN — ASPIRIN 81 MG: 81 TABLET, COATED ORAL at 09:06

## 2019-05-03 RX ADMIN — CYCLOBENZAPRINE HYDROCHLORIDE 10 MG: 10 TABLET, FILM COATED ORAL at 00:29

## 2019-05-03 ASSESSMENT — PAIN DESCRIPTION - DESCRIPTORS: DESCRIPTORS: BURNING

## 2019-05-03 ASSESSMENT — PAIN - FUNCTIONAL ASSESSMENT
PAIN_FUNCTIONAL_ASSESSMENT: PREVENTS OR INTERFERES SOME ACTIVE ACTIVITIES AND ADLS
PAIN_FUNCTIONAL_ASSESSMENT: PREVENTS OR INTERFERES SOME ACTIVE ACTIVITIES AND ADLS

## 2019-05-03 ASSESSMENT — PAIN DESCRIPTION - LOCATION
LOCATION: ABDOMEN
LOCATION: ABDOMEN

## 2019-05-03 ASSESSMENT — PAIN DESCRIPTION - PAIN TYPE: TYPE: CHRONIC PAIN

## 2019-05-03 ASSESSMENT — PAIN DESCRIPTION - PROGRESSION
CLINICAL_PROGRESSION: GRADUALLY WORSENING
CLINICAL_PROGRESSION: GRADUALLY IMPROVING

## 2019-05-03 ASSESSMENT — PAIN SCALES - GENERAL
PAINLEVEL_OUTOF10: 6
PAINLEVEL_OUTOF10: 5
PAINLEVEL_OUTOF10: 5
PAINLEVEL_OUTOF10: 3
PAINLEVEL_OUTOF10: 2

## 2019-05-03 ASSESSMENT — PAIN DESCRIPTION - ONSET
ONSET: ON-GOING
ONSET: ON-GOING

## 2019-05-03 ASSESSMENT — PAIN DESCRIPTION - FREQUENCY: FREQUENCY: INTERMITTENT

## 2019-05-03 NOTE — PLAN OF CARE
Continued monitoring of pt condition throughout hospitalization for improvement/worsening of condition

## 2019-05-03 NOTE — DISCHARGE SUMMARY
Hospital Medicine Discharge Summary    Patient ID: Jody Covarrubias      Patient's PCP: Michael Crowley Date: 5/2/2019     Discharge Date:   5/3/2019    Admitting Physician: Judie Linares MD     Discharge Physician: Judie Linares MD     Discharge Diagnoses: There are no active hospital problems to display for this patient. The patient was seen and examined on day of discharge and this discharge summary is in conjunction with any daily progress note from day of discharge. Hospital Course: The patient is a 70 y.o. female with hx HLD and hypothyroidism who presents to [facility] with left sided facial numbness. Pt states that she woke up this morning around 5AM and saw flashing red streaks of light of her left eye. She also stated that she was having left sided facial numbness along the left cheek, left lip, and left chin. Pt states that her blood pressure was also elevated to SBPs of 180s. She never has had high blood pressures before. She has never experienced symptoms like this before. Denies fever, chills, chest pain, shortness of breath, abdominal pain, nausea, vomiting, constipation, diarrhea, and dysuria.     CT Head without contrast, CTA Head and Neck all negative for any acute abnormality. Left facial numbness - resolved - 2/2 TIA and/or migraine with aura  -MRI Brain without contrast without acute abnormality  -neurology consulted, recs appreciated  -cont ASA and statin  -tele monitoring  -Labetalol PRN for SBP > 210  -permissive HTN for 24-48 hours, now WNL without intervention  -PT/OT   -eye symptoms resolved prior to discharge  -checked lipid panel in AM     Hypothyroidism  -continue home meds        Exam:     BP (!) 143/82   Pulse 76   Temp 97.6 °F (36.4 °C) (Oral)   Resp 16   Ht 5' 4\" (1.626 m)   Wt 136 lb 11 oz (62 kg)   SpO2 96%   BMI 23.46 kg/m²       General appearance:  No apparent distress, appears stated age and cooperative.   HEENT:  Normal 5/2/2019 at 08:03. CTA HEAD W CONTRAST   Final Result   1. Patient motion partially limits evaluation of the cervical carotid   arteries. 2. No convincing flow limiting stenosis of the cervical carotid/vertebral   arteries. 3. No focal stenosis of the [id number]-nn-Jpupnt. CTA NECK W CONTRAST   Final Result   1. Patient motion partially limits evaluation of the cervical carotid   arteries. 2. No convincing flow limiting stenosis of the cervical carotid/vertebral   arteries. 3. No focal stenosis of the ittsch-vn-Eniyli. Consults:     IP CONSULT TO STROKE TEAM  IP CONSULT TO NEUROLOGY    Disposition:  home     Condition at Discharge: Stable    Discharge Instructions/Follow-up:  PCP in 1 week    Code Status:  Full Code     Activity: activity as tolerated    Diet: regular diet      Discharge Medications:     Current Discharge Medication List           Details   Diclofenac Sodium  MG TB24 Take 100 mg by mouth daily  Refills: 3      tiZANidine (ZANAFLEX) 4 MG tablet Take 4 mg by mouth nightly as needed for Muscle spasms  Refills: 3      aspirin 325 MG EC tablet Take 325 mg by mouth daily      alendronate (FOSAMAX) 70 MG tablet Take 70 mg by mouth every 7 days Sunday      Cholecalciferol (VITAMIN D3) 5000 units TABS Take 5,000 Units by mouth daily       atorvastatin (LIPITOR) 20 MG tablet Take 20 mg by mouth daily      levothyroxine (SYNTHROID) 50 MCG tablet Take 50 mcg by mouth Daily             Time Spent on discharge is more than 30 minutes in the examination, evaluation, counseling and review of medications and discharge plan. Signed:    Gifty Parks MD   5/3/2019      Thank you Deepak Dan  for the opportunity to be involved in this patient's care. If you have any questions or concerns please feel free to contact me at [phone].

## 2019-05-03 NOTE — PROGRESS NOTES
Discharge order noted- IV removed without complication. Discharge instructions explained to patient and - all questions answered. Pt ambulated off of floor with  to private residence.

## 2019-05-03 NOTE — PLAN OF CARE
Problem: HEMODYNAMIC STATUS  Goal: Patient has stable vital signs and fluid balance  Outcome: Ongoing     Problem: ACTIVITY INTOLERANCE/IMPAIRED MOBILITY  Goal: Mobility/activity is maintained at optimum level for patient  Outcome: Ongoing     Problem: COMMUNICATION IMPAIRMENT  Goal: Ability to express needs and understand communication  5/3/2019 0958 by Levar Alvarez RN  Outcome: Ongoing  5/3/2019 0641 by Yinka Teran RN  Outcome: Ongoing     Problem: Pain:  Goal: Pain level will decrease  Description  Pain level will decrease  5/3/2019 0958 by Levar Alvarez RN  Outcome: Ongoing  5/3/2019 0641 by Yinka Teran RN  Note:   Pain/discomfort being managed with PRN analgesics per MD orders. Pt able to express presence and absence of pain and rate pain appropriately using numerical scale.    Goal: Control of acute pain  Description  Control of acute pain  5/3/2019 0958 by Levar Alvarez RN  Outcome: Ongoing  5/3/2019 0641 by Yinka Teran RN  Outcome: Ongoing  Goal: Control of chronic pain  Description  Control of chronic pain  5/3/2019 0958 by Levar Alvarez RN  Outcome: Ongoing  5/3/2019 0641 by Yinka Teran RN  Outcome: Ongoing

## 2019-05-03 NOTE — DISCHARGE INSTR - COC
Continuity of Care Form    Patient Name: Ailyn Vaughn   :  1940  MRN:  [id number]    Admit date:  2019  Discharge date:  ***    Code Status Order: Full Code   Advance Directives:   Advance Care Flowsheet Documentation     Date/Time Healthcare Directive Type of Healthcare Directive Copy in [address] Agent's Name Healthcare Agent's Phone Number    19 1444  Yes, patient has an advance directive for healthcare treatment  Durable power of  for health care  No, copy requested from family  Spouse  Leonardo Harris  [phone]          Admitting Physician:  Hattie Ramirez MD  PCP: Monica Hilton    Discharging Nurse: [facility] Unit/Room#: R0F-2788/5121-01  Discharging Unit Phone Number: ***    Emergency Contact:   Extended Emergency Contact Information  Primary Emergency Contact: Mansoor Greenberg  Address: [address] [place] [address],[facility] [address] Phone: [phone]  Work Phone: [phone]  Relation: Spouse  Secondary Emergency Contact: [address] [address] Phone: [phone]  Mobile Phone: [phone]  Relation: Child   needed? No    Past Surgical History:  Past Surgical History:   Procedure Laterality Date    COLONOSCOPY  2018    [place], normal    JOINT REPLACEMENT      THR    JOINT REPLACEMENT      R hip    UPPER GASTROINTESTINAL ENDOSCOPY  2018    [place]       Immunization History: There is no immunization history on file for this patient.     Active Problems:  Patient Active Problem List   Diagnosis Code   (none) - all problems resolved or deleted       Isolation/Infection:   Isolation          No Isolation            Nurse Assessment:  Last Vital Signs: BP (!) 143/82   Pulse 76   Temp 97.6 °F (36.4 °C) (Oral)   Resp 16   Ht 5' 4\" (1.626 m)   Wt 136 lb 11 oz (62 kg)   SpO2 96%   BMI 23.46 kg/m²     Last documented pain score (0-10 scale): Pain Level: 6  Last Weight:   Wt Readings from Last 1 Encounters:   19 136 lb 11 oz (62 kg)     Mental Status:  {IP PT MENTAL STATUS:}    IV Access:  { JOSELITO IV ACCESS:921760613}    Nursing Mobility/ADLs:  Walking   {CHP DME KTYO:608501813}  Transfer  {CHP DME DUSTIN:529234865}  Bathing  {CHP DME PIWH:841831739}  Dressing  {CHP DME VRQ}  Toileting  {CHP DME UPUO:675661683}  Feeding  {CHP DME IGCF:997195529}  Med Admin  {CHP DME HICV:172533403}  Med Delivery   { JOSELITO MED Delivery:266631474}    Wound Care Documentation and Therapy:        Elimination:  Continence:   · Bowel: {YES / VU:19417}  · Bladder: {YES / MJ:19429}  Urinary Catheter: {Urinary Catheter:446964517}   Colostomy/Ileostomy/Ileal Conduit: {YES / WY:57285}       Date of Last BM: ***    Intake/Output Summary (Last 24 hours) at 5/3/2019 1107  Last data filed at 5/3/2019 0912  Gross per 24 hour   Intake 800 ml   Output 500 ml   Net 300 ml     I/O last 3 completed shifts:   In: 360 [P.O.:360]  Out: 500 [Urine:500]    Safety Concerns:     [address] Safety Concerns:918135310}    Impairments/Disabilities:      [address] Impairments/Disabilities:483558032}    Nutrition Therapy:  Current Nutrition Therapy:   [address] [employer] [employer] Diet List:790864758}    Routes of Feeding: {P DME Other Feedings:439502129}  Liquids: {Slp liquid thickness:41417}  Daily Fluid Restriction: {CHP DME Yes amt example:173190674}  Last Modified Barium Swallow with Video (Video Swallowing Test): {Done Not Done OU:287190822}    Treatments at the Time of Hospital Discharge:   Respiratory Treatments: ***  Oxygen Therapy:  {Therapy; copd oxygen:66059}  Ventilator:    { CC Vent OMW}    Rehab Therapies: {THERAPEUTIC INTERVENTION:1113502986}  Weight Bearing Status/Restrictions: [address] [employer] Weight Bearin}  Other Medical Equipment (for information only, NOT a DME order):  {EQUIPMENT:276390952}  Other Treatments: ***    Patient's personal belongings (please select all that are sent with patient):  {CHP DME Belongings:193928885}    RN SIGNATURE:  {Esignature:360228748}    CASE MANAGEMENT/SOCIAL WORK SECTION    Inpatient Status Date: ***    Readmission Risk Assessment Score:  Readmission Risk              Risk of Unplanned Readmission:        6           Discharging to Facility/ Agency   · Name:   · Address:  · Phone:  · Fax:    Dialysis Facility (if applicable)   · Name:  · Address:  · Dialysis Schedule:  · Phone:  · Fax:    / signature: {Esignature:113457551}    PHYSICIAN SECTION    Prognosis: {Prognosis:8280579645}    Condition at Discharge: [facility] Patient Condition:580792390}    Rehab Potential (if transferring to Rehab): {Prognosis:3722984146}    Recommended Labs or Other Treatments After Discharge: ***    Physician Certification: I certify the above information and transfer of Marcus Liao  is necessary for the continuing treatment of the diagnosis listed and that she requires {Admit to Appropriate Level of Care:66776} for {GREATER/LESS:625189650} 30 days.      Update Admission H&P: {CHP DME Changes in RGHTV:641798765}    PHYSICIAN SIGNATURE:  {Esignature:470325808}

## 2019-05-06 LAB
EKG ATRIAL RATE: 62 BPM
EKG DIAGNOSIS: NORMAL
EKG P AXIS: 29 DEGREES
EKG P-R INTERVAL: 158 MS
EKG Q-T INTERVAL: 434 MS
EKG QRS DURATION: 84 MS
EKG QTC CALCULATION (BAZETT): 440 MS
EKG R AXIS: 3 DEGREES
EKG T AXIS: 45 DEGREES
EKG VENTRICULAR RATE: 62 BPM